# Patient Record
Sex: FEMALE | Race: WHITE | NOT HISPANIC OR LATINO | Employment: FULL TIME | URBAN - METROPOLITAN AREA
[De-identification: names, ages, dates, MRNs, and addresses within clinical notes are randomized per-mention and may not be internally consistent; named-entity substitution may affect disease eponyms.]

---

## 2023-11-09 ENCOUNTER — APPOINTMENT (OUTPATIENT)
Dept: GENERAL RADIOLOGY | Facility: HOSPITAL | Age: 23
End: 2023-11-09
Payer: MEDICAID

## 2023-11-09 ENCOUNTER — HOSPITAL ENCOUNTER (EMERGENCY)
Facility: HOSPITAL | Age: 23
Discharge: HOME | End: 2023-11-09
Attending: EMERGENCY MEDICINE
Payer: MEDICAID

## 2023-11-09 VITALS
DIASTOLIC BLOOD PRESSURE: 75 MMHG | SYSTOLIC BLOOD PRESSURE: 144 MMHG | TEMPERATURE: 98.1 F | WEIGHT: 215 LBS | RESPIRATION RATE: 16 BRPM | OXYGEN SATURATION: 99 % | HEART RATE: 74 BPM | HEIGHT: 68 IN | BODY MASS INDEX: 32.58 KG/M2

## 2023-11-09 DIAGNOSIS — S39.012A LUMBOSACRAL STRAIN, INITIAL ENCOUNTER: Primary | ICD-10-CM

## 2023-11-09 LAB
B-HCG UR QL: NEGATIVE
BILIRUB UR QL STRIP: NEGATIVE
CLARITY UR: CLEAR
COLOR UR: YELLOW
GLUCOSE UR STRIP-MCNC: NEGATIVE MG/DL
HGB UR QL STRIP.AUTO: NEGATIVE
KETONES UR QL STRIP: NEGATIVE
LEUKOCYTE ESTERASE UR QL STRIP.AUTO: NEGATIVE
NITRITE UR QL STRIP: NEGATIVE
PH UR STRIP.AUTO: 5.5 [PH] (ref 5–8)
PROT UR QL STRIP: NEGATIVE
SP GR UR STRIP: 1.02 (ref 1–1.03)
UROBILINOGEN UR QL STRIP: NORMAL

## 2023-11-09 PROCEDURE — 72110 X-RAY EXAM L-2 SPINE 4/>VWS: CPT

## 2023-11-09 PROCEDURE — 81025 URINE PREGNANCY TEST: CPT | Performed by: PHYSICIAN ASSISTANT

## 2023-11-09 PROCEDURE — 81003 URINALYSIS AUTO W/O SCOPE: CPT | Performed by: PHYSICIAN ASSISTANT

## 2023-11-09 PROCEDURE — 99283 EMERGENCY DEPT VISIT LOW MDM: CPT

## 2023-11-09 RX ORDER — DICLOFENAC SODIUM 75 MG/1
75 TABLET, DELAYED RELEASE ORAL 2 TIMES DAILY
Qty: 20 TABLET | Refills: 0 | Status: SHIPPED | OUTPATIENT
Start: 2023-11-09

## 2023-11-09 RX ORDER — CYCLOBENZAPRINE HCL 10 MG
10 TABLET ORAL NIGHTLY PRN
Qty: 7 TABLET | Refills: 0 | Status: SHIPPED | OUTPATIENT
Start: 2023-11-09

## 2023-11-09 NOTE — ED PROVIDER NOTES
EMERGENCY DEPARTMENT ENCOUNTER    Room Number:  S02/02  PCP: Provider, No Known  Discussed/ obtained information from independent historians: patient      HPI:  Chief Complaint: back pain  A complete HPI/ROS/PMH/PSH/SH/FH are unobtainable due to: none  Context: Pritesh Benavides is a 23 y.o. female who presents to the ED c/o right lower back pain that started after having an episode of vomiting yesterday morning.  She states she spent about 20 minutes with nausea vomiting and dry heaves.  That has fully resolved and has not reoccurred.  She denies having any diarrhea or abdominal pain as well as any hematuria dysuria urinary frequency.  Denies any falls or injury to her back as well as any prior back issues.  Denies saddle paresthesias lower extremity weakness or bowel or bladder dysfunction.      External (non-ED) record review: No prior records in [facility] or Care Everywhere available for review      PAST MEDICAL HISTORY  Active Ambulatory Problems     Diagnosis Date Noted    No Active Ambulatory Problems     Resolved Ambulatory Problems     Diagnosis Date Noted    No Resolved Ambulatory Problems     Past Medical History:   Diagnosis Date    Iron deficiency          PAST SURGICAL HISTORY  History reviewed. No pertinent surgical history.      FAMILY HISTORY  History reviewed. No pertinent family history.      SOCIAL HISTORY  Social History     Socioeconomic History    Marital status: Single   Tobacco Use    Smoking status: Every Day     Types: Cigarettes   Substance and Sexual Activity    Alcohol use: Never    Drug use: Never         ALLERGIES  Patient has no known allergies.        REVIEW OF SYSTEMS  Review of Systems         PHYSICAL EXAM  ED Triage Vitals   Temp Heart Rate Resp BP SpO2   11/09/23 1325 11/09/23 1325 11/09/23 1325 11/09/23 1345 11/09/23 1325   98.1 °F (36.7 °C) 91 16 144/75 99 %      Temp src Heart Rate Source Patient Position BP Location FiO2 (%)   11/09/23 1325 11/09/23 1325 -- -- --   Tympanic Monitor           Physical Exam      GENERAL: no acute distress  HENT: normocephalic, atraumatic  EYES: no scleral icterus  CV: regular rhythm, normal rate  RESPIRATORY: normal effort CTA B  ABDOMEN: nondistended soft nontender  MUSCULOSKELETAL: no deformity.  No midline tenderness to the C, T, L-spine.  There is some left sided paraspinal tenderness diffusely in the right mid and lower back. Sensation is intact to light touch throughout the bilateral lower extremities. Muscle strength is 5/5 and symmetrical with plantarflexion and EHL. Patellar reflexes are 2+ and equal bilaterally. DP and PT pulses are 2+ bilaterally.   NEURO: alert, moves all extremities, follows commands  PSYCH:  calm, cooperative  SKIN: warm, dry    Vital signs and nursing notes reviewed.          LAB RESULTS  Recent Results (from the past 24 hour(s))   Urinalysis With Microscopic If Indicated (No Culture) - Urine, Clean Catch    Collection Time: 11/09/23  3:20 PM    Specimen: Urine, Clean Catch   Result Value Ref Range    Color, UA Yellow Yellow, Straw    Appearance, UA Clear Clear    pH, UA 5.5 5.0 - 8.0    Specific Gravity, UA 1.021 1.005 - 1.030    Glucose, UA Negative Negative    Ketones, UA Negative Negative    Bilirubin, UA Negative Negative    Blood, UA Negative Negative    Protein, UA Negative Negative    Leuk Esterase, UA Negative Negative    Nitrite, UA Negative Negative    Urobilinogen, UA 0.2 E.U./dL 0.2 - 1.0 E.U./dL   Pregnancy, Urine - Urine, Clean Catch    Collection Time: 11/09/23  3:20 PM    Specimen: Urine, Clean Catch   Result Value Ref Range    HCG, Urine QL Negative Negative       Ordered the above labs and reviewed the results.        RADIOLOGY  XR Spine Lumbar Complete 4+VW    Result Date: 11/9/2023  XR SPINE LUMBAR COMPLETE 4+VW-  INDICATIONS: Pain.  TECHNIQUE: 6 VIEWS OF THE LUMBAR SPINE  COMPARISON: None available  FINDINGS:  Alignment is in range of normal. Vertebral body heights appear preserved. No acute fracture is  identified. Mild to space narrowing at L5/S1. If there is further clinical concern, MRI could be considered for further evaluation.       As described.    This report was finalized on 11/9/2023 3:56 PM by Dr. Hernan Retana M.D on Workstation: [id number]       Ordered the above noted radiological studies. Reviewed by me in PACS.            PROCEDURES  Procedures              MEDICATIONS GIVEN IN ER  Medications - No data to display                MEDICAL DECISION MAKING, PROGRESS, and CONSULTS    All labs have been independently reviewed by me.  All radiology studies have been reviewed by me and I have also reviewed the radiology report.   EKG's independently viewed and interpreted by me.  Discussion below represents my analysis of pertinent findings related to patient's condition, differential diagnosis, treatment plan and final disposition.            Orders placed during this visit:  Orders Placed This Encounter   Procedures    XR Spine Lumbar Complete 4+VW    Urinalysis With Microscopic If Indicated (No Culture) - Urine, Clean Catch    Pregnancy, Urine - Urine, Clean Catch    Ambulatory Referral to Family Practice           Differential diagnosis:  Muscle strain, compression fracture, herniated disc, cystitis, pyelonephritis      Independent interpretation of labs, radiology studies, and discussions with consultants:  ED Course as of 11/09/23 1843   Thu Nov 09, 2023   1549 HCG, Urine QL: Negative [KA]   1549 Nitrite, UA: Negative [KA]   1549 Leukocytes, UA: Negative [KA]   1549 Blood, UA: Negative [KA]   1549 My independent interpretation of the lumbar spine x-rays is appropriate vertebral alignment without evidence for fracture [KA]   1554 XR Spine Lumbar Complete 4+VW  My independent interpretation of the lumbar spine x-ray is no acute fracture [TR]   1642 I reassessed the patient, she is resting.  Counseled her on her imaging and labs.  Mechanism and work-up and physical exam consistent with lumbosacral  strain.  I have offered muscle relaxer and NSAID for symptom control, recommended follow-up with PCP, counseled on indications for return to the ER.  I placed a common referral as she does not have a PCP.  Work note provided.  She is no evidence of neurologic red flags or acute emergent condition. [KA]      ED Course User Index  [KA] Mulu Ugarte PA-C  [TR] Miguel Valadez MD       - Shared decision making: I recommended discharge with NSAID and anti-inflammatory, patient agreeable    Additional orders considered but not ordered:  I considered blood work but the patient has had no vomiting in greater than 24 hours and is tolerating p.o.-additional Labs not indicated      Additional sources:    - Chronic or social conditions impacting care: Recently started a new job          DIAGNOSIS  Final diagnoses:   Lumbosacral strain, initial encounter           Follow Up:  [facility] EMERGENCY DEPARTMENT  [address]  [facility] [address] [phone]    If symptoms worsen or any concerns      RX:     Medication List        New Prescriptions      cyclobenzaprine 10 MG tablet  Commonly known as: FLEXERIL  Take 1 tablet by mouth At Night As Needed for Muscle Spasms (back pain).     diclofenac 75 MG EC tablet  Commonly known as: VOLTAREN  Take 1 tablet by mouth 2 (Two) Times a Day. With food               Where to Get Your Medications        These medications were sent to [facility] [id number] - [address] AT [facility] [address] [facility] [address] - [phone]  - [phone] [address], [facility] [address]      Phone: [phone]   cyclobenzaprine 10 MG tablet  diclofenac 75 MG EC tablet         Latest Documented Vital Signs:  As of 18:43 EST  BP- 144/75 HR- 74 Temp- 98.1 °F (36.7 °C) (Tympanic) O2 sat- 99%              --    Please note that portions of this were completed with a voice recognition program.       Note Disclaimer: At [facility], we  believe that sharing information builds trust and better relationships. You are receiving this note because you are receiving care at [facility] or recently visited. It is possible you will see health information before a provider has talked with you about it. This kind of information can be easy to misunderstand. To help you fully understand what it means for your health, we urge you to discuss this note with your provider.             Mulu Ugarte PA-C  11/09/23 2086

## 2023-11-09 NOTE — ED PROVIDER NOTES
MD ATTESTATION NOTE    The VIC and I have discussed this patient's history, physical exam, and treatment plan.  I have reviewed the documentation and personally had a face to face interaction with the patient. I affirm the documentation and agree with the treatment and plan.  The attached note describes my personal findings.    I provided a substantive portion of the care of this patient. I personally performed the physical exam, in its entirety.    Independent Historians: Patient, family    A complete HPI/ROS/PMH/PSH/SH/FH are unobtainable due to: Nothing    Chronic or social conditions impacting patient care (social determinants of health): None    Pritesh Benavides is a 23 y.o. female who presents to the ED c/o acute low back pain.  The patient reports that yesterday she woke up with nausea and vomiting.  She reports it lasted about 20 minutes.  She reports since then she has had low back pain.  She denies any bowel or bladder incontinence.  She denies any dysuria or hematuria.  She denies any fevers or chills.  She denies any prior similar episodes.      Review of prior external notes (non-ED) -and- Review of prior external test results outside of this encounter: Extensive review of the EPIC system as well as tibdit reveals no prior visit notes and no prior diagnostic studies available for review.    Prescription drug monitoring program review:        On exam:  GENERAL: Awake, alert, no acute distress  SKIN: Warm, dry  HENT: Normocephalic, atraumatic  EYES: no scleral icterus  CV: regular rhythm, regular rate  RESPIRATORY: normal effort, lungs clear  ABDOMEN: soft, nontender, nondistended  MUSCULOSKELETAL: no deformity.  No midline thoracic or lumbar spine tenderness.  She has some right-sided muscular tenderness and right-sided CVA tenderness.  She has equal pedal pulses.  Normal lower extremity sensation and motor laterally  NEURO: alert, moves all extremities, follows commands                                                              Labs  Recent Results (from the past 24 hour(s))   Urinalysis With Microscopic If Indicated (No Culture) - Urine, Clean Catch    Collection Time: 11/09/23  3:20 PM    Specimen: Urine, Clean Catch   Result Value Ref Range    Color, UA Yellow Yellow, Straw    Appearance, UA Clear Clear    pH, UA 5.5 5.0 - 8.0    Specific Gravity, UA 1.021 1.005 - 1.030    Glucose, UA Negative Negative    Ketones, UA Negative Negative    Bilirubin, UA Negative Negative    Blood, UA Negative Negative    Protein, UA Negative Negative    Leuk Esterase, UA Negative Negative    Nitrite, UA Negative Negative    Urobilinogen, UA 0.2 E.U./dL 0.2 - 1.0 E.U./dL   Pregnancy, Urine - Urine, Clean Catch    Collection Time: 11/09/23  3:20 PM    Specimen: Urine, Clean Catch   Result Value Ref Range    HCG, Urine QL Negative Negative       Radiology  XR Spine Lumbar Complete 4+VW    Result Date: 11/9/2023  XR SPINE LUMBAR COMPLETE 4+VW-  INDICATIONS: Pain.  TECHNIQUE: 6 VIEWS OF THE LUMBAR SPINE  COMPARISON: None available  FINDINGS:  Alignment is in range of normal. Vertebral body heights appear preserved. No acute fracture is identified. Mild to space narrowing at L5/S1. If there is further clinical concern, MRI could be considered for further evaluation.       As described.    This report was finalized on 11/9/2023 3:56 PM by Dr. Hernan Retana M.D on Workstation: [employer]       Medical Decision Making:  ED Course as of 11/09/23 2001   Thu Nov 09, 2023   1549 HCG, Urine QL: Negative [KA]   1549 Nitrite, UA: Negative [KA]   1549 Leukocytes, UA: Negative [KA]   1549 Blood, UA: Negative [KA]   1549 My independent interpretation of the lumbar spine x-rays is appropriate vertebral alignment without evidence for fracture [KA]   1554 XR Spine Lumbar Complete 4+VW  My independent interpretation of the lumbar spine x-ray is no acute fracture [TR]   1642 I reassessed the patient, she is resting.  Counseled her on her imaging  and labs.  Mechanism and work-up and physical exam consistent with lumbosacral strain.  I have offered muscle relaxer and NSAID for symptom control, recommended follow-up with PCP, counseled on indications for return to the ER.  I placed a common referral as she does not have a PCP.  Work note provided.  She is no evidence of neurologic red flags or acute emergent condition. [KA]      ED Course User Index  [KA] Mulu Ugarte PA-C  [TR] Miguel Valadez MD       The patient presents with isolated vomiting yesterday that resolved spontaneously and now with low back pain.  Plan to obtain pregnancy test, urinalysis, and x-ray of the lumbar spine.  My differential diagnosis includes UTI, pregnancy, kidney stone, musculoskeletal back pain, and others.    Procedures:  Procedures                 Diagnosis  Final diagnoses:   Lumbosacral strain, initial encounter       Note Disclaimer: At [facility], we believe that sharing information builds trust and better relationships. You are receiving this note because you recently visited [facility]. It is possible you will see health information before a provider has talked with you about it. This kind of information can be easy to misunderstand. To help you fully understand what it means for your health, we urge you to discuss this note with your provider.       Miguel Valadez MD  11/09/23 2001

## 2023-11-09 NOTE — DISCHARGE INSTRUCTIONS
Gentle activities as tolerated.  Warm moist compresses or cold compresses to the areas of pain, whichever gives you the most relief.  Recheck with your primary care doctor next week  Return to the emergency department for bowel or bladder incontinence, urinary retention, lower extremity weakness, groin or genital numbness or tingling, any concerns.

## 2023-11-09 NOTE — Clinical Note
[facility] EMERGENCY DEPARTMENT  [address] [facility] [address]  Phone: [phone]    Pritesh Benavides was seen and treated in our emergency department on 11/9/2023.  She may return to work on 11/10/2023.         Thank you for choosing [facility].    Mulu Ugarte PA-C

## 2023-11-09 NOTE — ED NOTES
Pt to ER via PV. Pt states yesterday she woke up vomiting and then began to experience lower back pain. Pt states it feels similar to sciatic nerve pain. NKI. No numbness, tingling, or loss of bowel/bladder control.

## 2024-04-05 ENCOUNTER — TELEMEDICINE (OUTPATIENT)
Dept: FAMILY MEDICINE CLINIC | Facility: TELEHEALTH | Age: 24
End: 2024-04-05

## 2024-04-05 VITALS — HEIGHT: 68 IN | WEIGHT: 215 LBS | BODY MASS INDEX: 32.58 KG/M2

## 2024-04-05 DIAGNOSIS — R05.1 ACUTE COUGH: ICD-10-CM

## 2024-04-05 DIAGNOSIS — R68.89 FLU-LIKE SYMPTOMS: Primary | ICD-10-CM

## 2024-04-05 DIAGNOSIS — R11.2 NAUSEA AND VOMITING, UNSPECIFIED VOMITING TYPE: ICD-10-CM

## 2024-04-05 PROBLEM — H66.90 OTITIS MEDIA: Status: ACTIVE | Noted: 2024-04-05

## 2024-04-05 PROBLEM — H60.399 INFECTIVE OTITIS EXTERNA: Status: ACTIVE | Noted: 2024-04-05

## 2024-04-05 PROBLEM — J02.9 ACUTE PHARYNGITIS: Status: ACTIVE | Noted: 2024-04-05

## 2024-04-05 RX ORDER — METHYLPREDNISOLONE 4 MG/1
TABLET ORAL
Qty: 21 TABLET | Refills: 0 | Status: SHIPPED | OUTPATIENT
Start: 2024-04-05

## 2024-04-05 RX ORDER — BROMPHENIRAMINE MALEATE, PSEUDOEPHEDRINE HYDROCHLORIDE, AND DEXTROMETHORPHAN HYDROBROMIDE 2; 30; 10 MG/5ML; MG/5ML; MG/5ML
5 SYRUP ORAL 4 TIMES DAILY PRN
Qty: 118 ML | Refills: 0 | Status: SHIPPED | OUTPATIENT
Start: 2024-04-05

## 2024-04-05 NOTE — PROGRESS NOTES
"You have chosen to receive care through a telehealth visit.  Do you consent to use a video/audio connection for your medical care today? Yes     HPI  Pritesh Benavides is a 24 y.o. female  presents with complaint of cough. She reports that she went to her new job on 03/29/2024 and vomited while she was there so she was sent home. She has been sick since then. Now she is only vomiting from coughing so hard and she is trying to hydrate well. Additional symptoms that she is having are notes in the ROS portion of this visit. She has also been taking Mucinex for her symptoms. She does report that her sister did have like symptoms.    Review of Systems   Constitutional:  Positive for chills, diaphoresis and fatigue. Fever: unknown.  HENT:  Positive for congestion and postnasal drip.    Respiratory:  Positive for cough, chest tightness, shortness of breath and wheezing.         Coughing up mucous   Gastrointestinal:  Positive for diarrhea, nausea and vomiting.   Musculoskeletal:  Positive for myalgias (mild).   Neurological:  Positive for headaches.       Past Medical History:   Diagnosis Date    Iron deficiency        No family history on file.    Social History     Socioeconomic History    Marital status: Single   Tobacco Use    Smoking status: Every Day     Types: Cigarettes   Substance and Sexual Activity    Alcohol use: Never    Drug use: Never       Pritesh Benavides  reports that she has been smoking cigarettes. She does not have any smokeless tobacco history on file. This is to advise her to quit smoking. Thre are risks of diseases from using tobacco products such as cancer, COPD, and heart disease.     This is to advise you to quit smoking.    I spent  2  minutes counseling the patient.       Ht 172.7 cm (68\")   Wt 97.5 kg (215 lb)   Breastfeeding No   BMI 32.69 kg/m²     PHYSICAL EXAM  Physical Exam   Constitutional: She is oriented to person, place, and time. She appears well-developed.   HENT:   Head: Normocephalic and " atraumatic.   Nose: Congestion present.   Eyes: Lids are normal. Right eye exhibits no discharge. Left eye exhibits no discharge. Right conjunctiva is not injected. Left conjunctiva is not injected.   Pulmonary/Chest:  No respiratory distress (persistent cough through visit).  Abdominal: There is no abdominal tenderness.   Neurological: She is alert and oriented to person, place, and time. No cranial nerve deficit.   Psychiatric: She has a normal mood and affect. Her speech is normal and behavior is normal. Judgment and thought content normal.       Results for orders placed or performed during the hospital encounter of 11/09/23   Urinalysis With Microscopic If Indicated (No Culture) - Urine, Clean Catch    Specimen: Urine, Clean Catch   Result Value Ref Range    Color, UA Yellow Yellow, Straw    Appearance, UA Clear Clear    pH, UA 5.5 5.0 - 8.0    Specific Gravity, UA 1.021 1.005 - 1.030    Glucose, UA Negative Negative    Ketones, UA Negative Negative    Bilirubin, UA Negative Negative    Blood, UA Negative Negative    Protein, UA Negative Negative    Leuk Esterase, UA Negative Negative    Nitrite, UA Negative Negative    Urobilinogen, UA 0.2 E.U./dL 0.2 - 1.0 E.U./dL   Pregnancy, Urine - Urine, Clean Catch    Specimen: Urine, Clean Catch   Result Value Ref Range    HCG, Urine QL Negative Negative       Diagnoses and all orders for this visit:    1. Flu-like symptoms (Primary)    2. Acute cough    3. Nausea and vomiting, unspecified vomiting type    Other orders  -     methylPREDNISolone (MEDROL) 4 MG dose pack; Take as directed on package instructions.  Dispense: 21 tablet; Refill: 0  -     brompheniramine-pseudoephedrine-DM 30-2-10 MG/5ML syrup; Take 5 mL by mouth 4 (Four) Times a Day As Needed for Cough.  Dispense: 118 mL; Refill: 0    Take Medrol with food as early in the day as possible  Do not take Medrol with nsaids such as ibuprofen, aleve, or aspirin  May take tylenol for pain or fever  Bromfed as needed  for cough, congestion, allergies  Continue Mucinex with plenty of fluids especially water to thin secretions and help with congestion.  Hydrate well. Water, Pedialyte and Gatorade are best.  [place] diet    FOLLOW-UP  If symptoms worsen or persist follow up with PCP, [facility] or Urgent Care    Patient verbalizes understanding of medication dosage, comfort measures, instructions for treatment and follow-up.    Shannan Magaña, APRN  04/05/2024  11:27 EDT    The use of a video visit has been reviewed with the patient and verbal informed consent has been obtained. Myself and Pritesh Benavides participated in this visit. The patient is located in [address] [place] [address].    I am located in [place], [place]. [employer] and [employer] were utilized. I spent 28  minutes in the patient's chart for this visit.

## 2024-04-05 NOTE — LETTER
April 5, 2024       No Recipients    Patient: Pritesh Benavides   YOB: 2000   Date of Visit: 4/5/2024     To who it may concern:     Pritesh Benavides was evaluated by me and may return to work on 04/07/2024.     Sincerely,        LAURA Sebastian        CC:   No Recipients